# Patient Record
Sex: MALE | Employment: FULL TIME | URBAN - METROPOLITAN AREA
[De-identification: names, ages, dates, MRNs, and addresses within clinical notes are randomized per-mention and may not be internally consistent; named-entity substitution may affect disease eponyms.]

---

## 2023-12-14 ENCOUNTER — TELEPHONE (OUTPATIENT)
Dept: HEMATOLOGY/ONCOLOGY | Facility: CLINIC | Age: 63
End: 2023-12-14
Payer: MEDICARE

## 2023-12-14 NOTE — TELEPHONE ENCOUNTER
Pt called to inquire about becoming a new pt here at [facility]. 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 He currently lives in [place] but used to live in [place] for 20yrs & has someone he can stay with. His friend works here. 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 His workup has been done at [facility] & he is scheduled for prostate surgery on 1/5/24 up there. We discussed record collection, he would like to come late January after he recovers from surgery. Will proceed with records & then schedule. Pt agreeable.